# Patient Record
Sex: MALE | Race: AMERICAN INDIAN OR ALASKA NATIVE | NOT HISPANIC OR LATINO | URBAN - METROPOLITAN AREA
[De-identification: names, ages, dates, MRNs, and addresses within clinical notes are randomized per-mention and may not be internally consistent; named-entity substitution may affect disease eponyms.]

---

## 2024-07-26 ENCOUNTER — APPOINTMENT (RX ONLY)
Dept: URBAN - METROPOLITAN AREA CLINIC 141 | Facility: CLINIC | Age: 79
Setting detail: DERMATOLOGY
End: 2024-07-26

## 2024-07-26 DIAGNOSIS — L20.89 OTHER ATOPIC DERMATITIS: ICD-10-CM

## 2024-07-26 DIAGNOSIS — D49.2 NEOPLASM OF UNSPECIFIED BEHAVIOR OF BONE, SOFT TISSUE, AND SKIN: ICD-10-CM

## 2024-07-26 PROCEDURE — ? BIOPSY BY PUNCH METHOD

## 2024-07-26 PROCEDURE — ? COUNSELING

## 2024-07-26 PROCEDURE — 11104 PUNCH BX SKIN SINGLE LESION: CPT

## 2024-07-26 PROCEDURE — 99204 OFFICE O/P NEW MOD 45 MIN: CPT | Mod: 25

## 2024-07-26 PROCEDURE — ? PRESCRIPTION

## 2024-07-26 RX ORDER — TRIAMCINOLONE ACETONIDE 1 MG/G
CREAM TOPICAL BID
Qty: 454 | Refills: 2 | Status: ERX | COMMUNITY
Start: 2024-07-26

## 2024-07-26 RX ORDER — PREDNISONE 5 MG/1
TABLET ORAL
Qty: 42 | Refills: 0 | Status: ERX | COMMUNITY
Start: 2024-07-26

## 2024-07-26 RX ORDER — HYDROXYZINE HYDROCHLORIDE 10 MG/1
TABLET, FILM COATED ORAL QHS
Qty: 60 | Refills: 5 | Status: ERX | COMMUNITY
Start: 2024-07-26

## 2024-07-26 RX ADMIN — TRIAMCINOLONE ACETONIDE: 1 CREAM TOPICAL at 00:00

## 2024-07-26 RX ADMIN — HYDROXYZINE HYDROCHLORIDE: 10 TABLET, FILM COATED ORAL at 00:00

## 2024-07-26 RX ADMIN — PREDNISONE: 5 TABLET ORAL at 00:00

## 2024-07-26 ASSESSMENT — LOCATION DETAILED DESCRIPTION DERM
LOCATION DETAILED: LEFT INFERIOR LATERAL MIDBACK
LOCATION DETAILED: LEFT DISTAL DORSAL FOREARM
LOCATION DETAILED: RIGHT PROXIMAL DORSAL FOREARM
LOCATION DETAILED: LEFT SUPERIOR MEDIAL MIDBACK

## 2024-07-26 ASSESSMENT — LOCATION ZONE DERM
LOCATION ZONE: TRUNK
LOCATION ZONE: ARM

## 2024-07-26 ASSESSMENT — LOCATION SIMPLE DESCRIPTION DERM
LOCATION SIMPLE: RIGHT FOREARM
LOCATION SIMPLE: LEFT LOWER BACK
LOCATION SIMPLE: LEFT FOREARM

## 2024-07-26 NOTE — PROCEDURE: BIOPSY BY PUNCH METHOD

## 2024-08-23 ENCOUNTER — APPOINTMENT (RX ONLY)
Dept: URBAN - METROPOLITAN AREA CLINIC 141 | Facility: CLINIC | Age: 79
Setting detail: DERMATOLOGY
End: 2024-08-23

## 2024-08-23 DIAGNOSIS — L20.89 OTHER ATOPIC DERMATITIS: ICD-10-CM | Status: IMPROVED

## 2024-08-23 PROCEDURE — 99214 OFFICE O/P EST MOD 30 MIN: CPT

## 2024-08-23 PROCEDURE — ? PRESCRIPTION MEDICATION MANAGEMENT

## 2024-08-23 PROCEDURE — ? COUNSELING

## 2024-08-23 ASSESSMENT — LOCATION DETAILED DESCRIPTION DERM
LOCATION DETAILED: LEFT DISTAL DORSAL FOREARM
LOCATION DETAILED: RIGHT PROXIMAL DORSAL FOREARM
LOCATION DETAILED: LEFT SUPERIOR MEDIAL MIDBACK

## 2024-08-23 ASSESSMENT — LOCATION SIMPLE DESCRIPTION DERM
LOCATION SIMPLE: LEFT LOWER BACK
LOCATION SIMPLE: LEFT FOREARM
LOCATION SIMPLE: RIGHT FOREARM

## 2024-08-23 ASSESSMENT — LOCATION ZONE DERM
LOCATION ZONE: TRUNK
LOCATION ZONE: ARM

## 2024-08-23 NOTE — PROCEDURE: PRESCRIPTION MEDICATION MANAGEMENT
Detail Level: Simple
Continue Regimen: Hydroxyzine HCl 10 mg tablet (Take one to 2 pill at bedtime prn itch\\n\\nTriamcinolone acetonide 0.1 % topical cream BID (Apply twice a day to affected areas on body up to two weeks, then take one week off. Repeat as needed).
Plan: Follow up in 3 months or sooner if needed. 
Render In Strict Bullet Format?: No

## 2025-05-13 ENCOUNTER — APPOINTMENT (OUTPATIENT)
Dept: URBAN - METROPOLITAN AREA CLINIC 141 | Facility: CLINIC | Age: 80
Setting detail: DERMATOLOGY
End: 2025-05-13

## 2025-05-13 DIAGNOSIS — L20.89 OTHER ATOPIC DERMATITIS: ICD-10-CM

## 2025-05-13 PROCEDURE — ? PRESCRIPTION

## 2025-05-13 PROCEDURE — 99213 OFFICE O/P EST LOW 20 MIN: CPT

## 2025-05-13 PROCEDURE — ? DUPIXENT INITIATION

## 2025-05-13 PROCEDURE — ? PRESCRIPTION MEDICATION MANAGEMENT

## 2025-05-13 PROCEDURE — ? COUNSELING

## 2025-05-13 PROCEDURE — ? ADDITIONAL NOTES

## 2025-05-13 RX ORDER — PREDNISONE 20 MG/1
TABLET ORAL
Qty: 14 | Refills: 0 | Status: ERX | COMMUNITY
Start: 2025-05-13

## 2025-05-13 RX ORDER — TRIAMCINOLONE ACETONIDE 1 MG/G
CREAM TOPICAL
Qty: 454 | Refills: 0 | Status: ERX | COMMUNITY
Start: 2025-05-13

## 2025-05-13 RX ORDER — DUPILUMAB 300 MG/2ML
INJECTION, SOLUTION SUBCUTANEOUS
Qty: 2 | Refills: 11 | Status: ERX | COMMUNITY
Start: 2025-05-13

## 2025-05-13 RX ADMIN — TRIAMCINOLONE ACETONIDE: 1 CREAM TOPICAL at 00:00

## 2025-05-13 RX ADMIN — PREDNISONE: 20 TABLET ORAL at 00:00

## 2025-05-13 RX ADMIN — DUPILUMAB: 300 INJECTION, SOLUTION SUBCUTANEOUS at 00:00

## 2025-05-13 ASSESSMENT — LOCATION DETAILED DESCRIPTION DERM
LOCATION DETAILED: RIGHT ANKLE
LOCATION DETAILED: LEFT DORSAL FOOT
LOCATION DETAILED: LEFT INFERIOR ANTERIOR NECK

## 2025-05-13 ASSESSMENT — LOCATION ZONE DERM
LOCATION ZONE: NECK
LOCATION ZONE: LEG
LOCATION ZONE: FEET

## 2025-05-13 ASSESSMENT — LOCATION SIMPLE DESCRIPTION DERM
LOCATION SIMPLE: LEFT FOOT
LOCATION SIMPLE: RIGHT ANKLE
LOCATION SIMPLE: LEFT ANTERIOR NECK

## 2025-05-13 ASSESSMENT — ITCH NUMERIC RATING SCALE: HOW SEVERE IS YOUR ITCHING?: 8

## 2025-05-13 ASSESSMENT — BSA ECZEMA: % BODY COVERED IN ECZEMA: 22

## 2025-05-13 NOTE — PROCEDURE: ADDITIONAL NOTES
Detail Level: Simple
Additional Notes: Pt stated that this itching non stop. When he had triamcinolone, this would help previously but he has run out. Discussed the usage of oral medications. Discussed Dupixent injection as a better option. Stated that he has had this since he was 22 years old. Oral medication helps as well as topical but always comes back. Will start paper work for injection. Medication of Dupixent sent through [facility].
Render Risk Assessment In Note?: no

## 2025-05-13 NOTE — PROCEDURE: PRESCRIPTION MEDICATION MANAGEMENT
Render In Strict Bullet Format?: No
Initiate Treatment: prednisone 20 mg tablet \\nQuantity: 14.0 Tablet\\nSig: Take 2 pills for 4 days, then 1 pill for 4 days, then 1/2 pill for 4 days\\n\\ntriamcinolone acetonide 0.1 % topical cream \\nQuantity: 454.0 g\\nSig: Apply to affected areas twice daily x 2 weeks then discontinue for one week PRN flareups, do not apply to face, groin, genitalia, or axilla\\n\\nDupixent 300 mg/2 mL subcutaneous pen injector \\nQuantity: 2.0 ml\\nSig: Inject two sq for the first  2 weeks then one sq every two weeks.
Detail Level: Zone

## 2025-05-21 RX ORDER — DUPILUMAB 300 MG/2ML
INJECTION, SOLUTION SUBCUTANEOUS
Qty: 2 | Refills: 11 | Status: ERX

## 2025-06-04 ENCOUNTER — APPOINTMENT (OUTPATIENT)
Dept: URBAN - METROPOLITAN AREA CLINIC 141 | Facility: CLINIC | Age: 80
Setting detail: DERMATOLOGY
End: 2025-06-04

## 2025-06-04 DIAGNOSIS — L20.89 OTHER ATOPIC DERMATITIS: ICD-10-CM

## 2025-06-04 PROCEDURE — ? PRESCRIPTION MEDICATION MANAGEMENT

## 2025-06-04 PROCEDURE — ? ADDITIONAL NOTES

## 2025-06-04 PROCEDURE — ?

## 2025-06-04 PROCEDURE — ? COUNSELING

## 2025-06-04 RX ORDER — DUPILUMAB 300 MG/2ML
INJECTION, SOLUTION SUBCUTANEOUS
Qty: 2 | Refills: 11 | Status: ERX

## 2025-06-04 ASSESSMENT — LOCATION SIMPLE DESCRIPTION DERM
LOCATION SIMPLE: RIGHT ANKLE
LOCATION SIMPLE: LEFT ANTERIOR NECK
LOCATION SIMPLE: LEFT FOOT

## 2025-06-04 ASSESSMENT — LOCATION DETAILED DESCRIPTION DERM
LOCATION DETAILED: LEFT DORSAL FOOT
LOCATION DETAILED: RIGHT ANKLE
LOCATION DETAILED: LEFT INFERIOR ANTERIOR NECK

## 2025-06-04 ASSESSMENT — LOCATION ZONE DERM
LOCATION ZONE: NECK
LOCATION ZONE: LEG
LOCATION ZONE: FEET

## 2025-06-04 NOTE — PROCEDURE: PRESCRIPTION MEDICATION MANAGEMENT
Detail Level: Zone
Render In Strict Bullet Format?: No
Continue Regimen: triamcinolone cream as needed.

## 2025-06-04 NOTE — PROCEDURE: ADDITIONAL NOTES
Detail Level: Simple
Additional Notes: Pt stated that this itching is gone.Discussed Dupixent injection as a better option,Dupixent paperwork started last office visit but patient declined treatment today,he states is feeling better,and wants to continue with topical treatment.Dupixent in hold for now.
Render Risk Assessment In Note?: no